# Patient Record
Sex: FEMALE | Race: WHITE | NOT HISPANIC OR LATINO | Employment: UNEMPLOYED | URBAN - NONMETROPOLITAN AREA
[De-identification: names, ages, dates, MRNs, and addresses within clinical notes are randomized per-mention and may not be internally consistent; named-entity substitution may affect disease eponyms.]

---

## 2023-08-24 PROBLEM — E10.65 TYPE 1 DIABETES MELLITUS WITH HYPERGLYCEMIA (MULTI): Status: ACTIVE | Noted: 2023-08-24

## 2023-08-24 PROBLEM — F41.9 ANXIETY: Status: ACTIVE | Noted: 2023-08-24

## 2023-08-24 PROBLEM — R59.0 ADENOPATHY, CERVICAL: Status: ACTIVE | Noted: 2023-08-24

## 2023-08-24 RX ORDER — CITALOPRAM 20 MG/1
20 TABLET, FILM COATED ORAL DAILY
COMMUNITY
Start: 2015-08-31 | End: 2023-09-11 | Stop reason: SDUPTHER

## 2023-08-24 RX ORDER — INSULIN GLARGINE 100 [IU]/ML
INJECTION, SOLUTION SUBCUTANEOUS DAILY
COMMUNITY
Start: 2022-01-03 | End: 2023-10-20 | Stop reason: WASHOUT

## 2023-08-24 RX ORDER — METFORMIN HYDROCHLORIDE 500 MG/1
2 TABLET ORAL 2 TIMES DAILY
COMMUNITY
Start: 2019-02-26 | End: 2023-09-11 | Stop reason: ALTCHOICE

## 2023-08-24 RX ORDER — SEMAGLUTIDE 1.34 MG/ML
INJECTION, SOLUTION SUBCUTANEOUS
COMMUNITY
Start: 2021-08-30 | End: 2023-09-11 | Stop reason: ALTCHOICE

## 2023-09-11 ENCOUNTER — OFFICE VISIT (OUTPATIENT)
Dept: PRIMARY CARE | Facility: CLINIC | Age: 38
End: 2023-09-11
Payer: COMMERCIAL

## 2023-09-11 VITALS
WEIGHT: 165 LBS | HEART RATE: 86 BPM | OXYGEN SATURATION: 97 % | DIASTOLIC BLOOD PRESSURE: 82 MMHG | SYSTOLIC BLOOD PRESSURE: 122 MMHG

## 2023-09-11 DIAGNOSIS — E10.65 TYPE 1 DIABETES MELLITUS WITH HYPERGLYCEMIA (MULTI): Primary | ICD-10-CM

## 2023-09-11 DIAGNOSIS — F41.9 ANXIETY: ICD-10-CM

## 2023-09-11 LAB
ALANINE AMINOTRANSFERASE (SGPT) (U/L) IN SER/PLAS: 19 U/L (ref 7–45)
ALBUMIN (G/DL) IN SER/PLAS: 4.2 G/DL (ref 3.4–5)
ALKALINE PHOSPHATASE (U/L) IN SER/PLAS: 107 U/L (ref 33–110)
ANION GAP IN SER/PLAS: 11 MMOL/L (ref 10–20)
ASPARTATE AMINOTRANSFERASE (SGOT) (U/L) IN SER/PLAS: 18 U/L (ref 9–39)
BASOPHILS (10*3/UL) IN BLOOD BY AUTOMATED COUNT: 0.02 X10E9/L (ref 0–0.1)
BASOPHILS/100 LEUKOCYTES IN BLOOD BY AUTOMATED COUNT: 0.4 % (ref 0–2)
BILIRUBIN TOTAL (MG/DL) IN SER/PLAS: 0.4 MG/DL (ref 0–1.2)
CALCIUM (MG/DL) IN SER/PLAS: 9 MG/DL (ref 8.6–10.6)
CARBON DIOXIDE, TOTAL (MMOL/L) IN SER/PLAS: 29 MMOL/L (ref 21–32)
CHLORIDE (MMOL/L) IN SER/PLAS: 103 MMOL/L (ref 98–107)
CHOLESTEROL (MG/DL) IN SER/PLAS: 165 MG/DL (ref 0–199)
CHOLESTEROL IN HDL (MG/DL) IN SER/PLAS: 53 MG/DL
CHOLESTEROL/HDL RATIO: 3.1
CREATININE (MG/DL) IN SER/PLAS: 0.5 MG/DL (ref 0.5–1.05)
EOSINOPHILS (10*3/UL) IN BLOOD BY AUTOMATED COUNT: 0.12 X10E9/L (ref 0–0.7)
EOSINOPHILS/100 LEUKOCYTES IN BLOOD BY AUTOMATED COUNT: 2.2 % (ref 0–6)
ERYTHROCYTE DISTRIBUTION WIDTH (RATIO) BY AUTOMATED COUNT: 13 % (ref 11.5–14.5)
ERYTHROCYTE MEAN CORPUSCULAR HEMOGLOBIN CONCENTRATION (G/DL) BY AUTOMATED: 31.2 G/DL (ref 32–36)
ERYTHROCYTE MEAN CORPUSCULAR VOLUME (FL) BY AUTOMATED COUNT: 89 FL (ref 80–100)
ERYTHROCYTES (10*6/UL) IN BLOOD BY AUTOMATED COUNT: 4.45 X10E12/L (ref 4–5.2)
GFR FEMALE: >90 ML/MIN/1.73M2
GLUCOSE (MG/DL) IN SER/PLAS: 222 MG/DL (ref 74–99)
HEMATOCRIT (%) IN BLOOD BY AUTOMATED COUNT: 39.7 % (ref 36–46)
HEMOGLOBIN (G/DL) IN BLOOD: 12.4 G/DL (ref 12–16)
IMMATURE GRANULOCYTES/100 LEUKOCYTES IN BLOOD BY AUTOMATED COUNT: 0.4 % (ref 0–0.9)
LDL: 98 MG/DL (ref 0–99)
LEUKOCYTES (10*3/UL) IN BLOOD BY AUTOMATED COUNT: 5.4 X10E9/L (ref 4.4–11.3)
LYMPHOCYTES (10*3/UL) IN BLOOD BY AUTOMATED COUNT: 1.6 X10E9/L (ref 1.2–4.8)
LYMPHOCYTES/100 LEUKOCYTES IN BLOOD BY AUTOMATED COUNT: 29.4 % (ref 13–44)
MONOCYTES (10*3/UL) IN BLOOD BY AUTOMATED COUNT: 0.31 X10E9/L (ref 0.1–1)
MONOCYTES/100 LEUKOCYTES IN BLOOD BY AUTOMATED COUNT: 5.7 % (ref 2–10)
NEUTROPHILS (10*3/UL) IN BLOOD BY AUTOMATED COUNT: 3.37 X10E9/L (ref 1.2–7.7)
NEUTROPHILS/100 LEUKOCYTES IN BLOOD BY AUTOMATED COUNT: 61.9 % (ref 40–80)
NRBC (PER 100 WBCS) BY AUTOMATED COUNT: 0 /100 WBC (ref 0–0)
PLATELETS (10*3/UL) IN BLOOD AUTOMATED COUNT: 295 X10E9/L (ref 150–450)
POTASSIUM (MMOL/L) IN SER/PLAS: 4.4 MMOL/L (ref 3.5–5.3)
PROTEIN TOTAL: 7 G/DL (ref 6.4–8.2)
SODIUM (MMOL/L) IN SER/PLAS: 139 MMOL/L (ref 136–145)
TRIGLYCERIDE (MG/DL) IN SER/PLAS: 70 MG/DL (ref 0–149)
UREA NITROGEN (MG/DL) IN SER/PLAS: 12 MG/DL (ref 6–23)
VLDL: 14 MG/DL (ref 0–40)

## 2023-09-11 PROCEDURE — 99213 OFFICE O/P EST LOW 20 MIN: CPT | Performed by: FAMILY MEDICINE

## 2023-09-11 PROCEDURE — 3074F SYST BP LT 130 MM HG: CPT | Performed by: FAMILY MEDICINE

## 2023-09-11 PROCEDURE — 80061 LIPID PANEL: CPT

## 2023-09-11 PROCEDURE — 82043 UR ALBUMIN QUANTITATIVE: CPT

## 2023-09-11 PROCEDURE — 3079F DIAST BP 80-89 MM HG: CPT | Performed by: FAMILY MEDICINE

## 2023-09-11 PROCEDURE — 1036F TOBACCO NON-USER: CPT | Performed by: FAMILY MEDICINE

## 2023-09-11 PROCEDURE — 82570 ASSAY OF URINE CREATININE: CPT

## 2023-09-11 PROCEDURE — 80053 COMPREHEN METABOLIC PANEL: CPT

## 2023-09-11 PROCEDURE — 85025 COMPLETE CBC W/AUTO DIFF WBC: CPT

## 2023-09-11 RX ORDER — FLASH GLUCOSE SENSOR
KIT MISCELLANEOUS
COMMUNITY
Start: 2023-07-26 | End: 2023-10-09

## 2023-09-11 RX ORDER — CITALOPRAM 20 MG/1
20 TABLET, FILM COATED ORAL DAILY
Qty: 90 TABLET | Refills: 3 | Status: SHIPPED | OUTPATIENT
Start: 2023-09-11

## 2023-09-11 RX ORDER — INSULIN LISPRO 100 [IU]/ML
INJECTION, SOLUTION INTRAVENOUS; SUBCUTANEOUS
COMMUNITY
Start: 2023-06-23 | End: 2023-10-20 | Stop reason: SDUPTHER

## 2023-09-11 NOTE — PATIENT INSTRUCTIONS
If you decide you want to get the GAP done  - let me know and I will send in order    I will have test results on your secureach card within a week  - if not - call me.          [facility] 's   (Breast Cancer and Cervical Cancer Prevention )  phone number  is [phone].     This is a program who will cover the costs of breast and pelvic exams, pap smears, and mammograms, as well as the follow up to any abnormalities found with those tests.      They  help women who are uninsured or under insured.     If you haven't yet,  please give them a call to see if you qualify for the program.    If you do,  they will have paperwork for you to fill out and send back.     If you have qualified for the program,  they will have to set up all appointments for you.     If you are Gu[place], the [facility] ( an [facility] center) sponsors periodic clinics with [facility].  You can ask the [facility] people if you could have an appointment there  OR  in the office with me.       If you are non-Guido, your visit will only be with me, here in the office.      If you already have your order for a mammogram,  be sure to contact them to set up the mammogram appointment.     Be sure to take your mammogram order with you to your appointment, along with any [facility] paperwork you may have.     If you have already had your testing, you will always hear about your results.  So if 3 weeks go by, and you have not heard anything, please let either myself or the people at [facility] know.     And,  please tell any of your friends who may not have insurance about this fantastic program!

## 2023-09-11 NOTE — PROGRESS NOTES
Subjective   Patient ID: Alessandra Medrano is a 38 y.o. female who presents for Diabetes (She sees an endocrine but the endocrine wanted her to get some other labs done.).    HPI     Diabetes type 1 -   Seeing Endocrine   NP with DR Loyda Peralta - Lantus   20 units hs                Humalog - counting carbs  and sliding scale     DTR has DMT1 too       BUD - on Citalopram 20 mg a day  - working well       WWEs - has not started yet   Gyn - sees every 2 years       Review of Systems    Objective   /82 (BP Location: Right arm, Patient Position: Sitting)   Pulse 86   Wt 74.8 kg (165 lb)   SpO2 97%     Physical Exam  Constitutional:       General: She is not in acute distress.     Appearance: Normal appearance.   HENT:      Head: Normocephalic and atraumatic.      Nose: Nose normal.      Mouth/Throat:      Pharynx: No posterior oropharyngeal erythema.   Eyes:      Extraocular Movements: Extraocular movements intact.      Conjunctiva/sclera: Conjunctivae normal.      Pupils: Pupils are equal, round, and reactive to light.   Cardiovascular:      Rate and Rhythm: Normal rate and regular rhythm.      Heart sounds: Normal heart sounds. No murmur heard.  Pulmonary:      Effort: Pulmonary effort is normal. No respiratory distress.      Breath sounds: Normal breath sounds. No wheezing.   Musculoskeletal:      Cervical back: Neck supple.   Lymphadenopathy:      Cervical: No cervical adenopathy.   Skin:     General: Skin is warm and dry.      Findings: No rash.   Neurological:      General: No focal deficit present.      Mental Status: She is alert.   Psychiatric:         Mood and Affect: Mood normal.         Thought Content: Thought content normal.         Assessment/Plan   Problem List Items Addressed This Visit          Medium    Anxiety    Relevant Medications    citalopram (CeleXA) 20 mg tablet    Type 1 diabetes mellitus with hyperglycemia (CMS/HCC) - Primary    Relevant Orders    Lipid Panel    Albumin , Urine  Random    CBC and Auto Differential    Comprehensive Metabolic Panel     38 year old -   DMT1 -   Working with endocrine -   DTR with it too     Discussed possibly  getting genetic testing     Labs today     And doing well with citalopram - refilled that     Urged WWE - gave BCCP info     We discussed at visit any disease processes that were of concern as well as the risks, benefits and instructions of any new medication provided.    See orders and discussion section for information handed to patient on their Clinical Summary.   Patient (and/or caretaker of patient if present)  stated all questions were answered, and they voiced understanding of instructions.

## 2023-09-12 LAB
ALBUMIN (MG/L) IN URINE: 75.8 MG/L
ALBUMIN/CREATININE (UG/MG) IN URINE: 66.5 UG/MG CRT (ref 0–30)
CREATININE (MG/DL) IN URINE: 114 MG/DL (ref 20–320)

## 2023-10-09 DIAGNOSIS — E10.65 TYPE 1 DIABETES MELLITUS WITH HYPERGLYCEMIA (MULTI): Primary | ICD-10-CM

## 2023-10-09 RX ORDER — FLASH GLUCOSE SENSOR
KIT MISCELLANEOUS
Qty: 6 EACH | Refills: 0 | Status: SHIPPED | OUTPATIENT
Start: 2023-10-09 | End: 2024-01-25

## 2023-10-19 NOTE — PROGRESS NOTES
Chief Complaint   Patient presents with    Diabetes     Type 1 diabetes         HPI:    Here for follow up/metabolic management 39 yo with DM Type 1 diagnosed AGE 35 initally diagnosed with Type 2 at age 24 yo. History of anxiety. Current A1C 8.3% was 8.1%. Patient testing sugars 4-6 times day with the Raquel 2/reader. Following carb controlled diet somewhat and know reasonable carb allowances. Patient is not able to afford their medications, uses  [facility] for insulins. Patient is exercising.     -CGM Raquel 2 with readerCurrently on insulin checking BS at least 4 xs a day adjustments made based on readings/frequent visits to manage.  -INSULIN Lantus 21 was 14 units nightly, decreased to 19 units today, Humalog ICR 1:5 B/L/D ISF 25>125 taking 1-2 units with am coffee.  -HTN normotensive may consider low dose ACE for renal protection + protein recent urine  -STATIN not taking LDL 98 discuss at follow up             Raquel 2 report reviewed, 90 days time in target 45% with 1% lows. Mostly going to bed in target, waking blood sugars have been closer to 130 or below, however she has had low bs between 2-4 am since increasing the basal to 21 units. Spikes greatest with dinner. Dosing with meals. Counting carbs. Will over correct with lows resulting in hyperglycemia.        Current Outpatient Medications:     citalopram (CeleXA) 20 mg tablet, Take 1 tablet (20 mg) by mouth once daily., Disp: 90 tablet, Rfl: 3    FreeStyle Raquel 2 Sensor kit, USE AS DIRECTED AND  CHANGE  OUT  EVERY  14  DAYS, Disp: 6 each, Rfl: 0    insulin glargine (Lantus) 100 unit/mL (3 mL) pen, INJECT SUBCUTANEOUSLY AS DIRECTED UP TO 30 UNITS PER DAY, Disp: 30 mL, Rfl: 2    insulin lispro (HumaLOG) 100 unit/mL injection, INJECT UNDER THE SKIN AS DIRECTED BEFORE MEALS UP TO 50 UNITS DAILY, Disp: 45 mL, Rfl: 2    /78 (BP Location: Left arm, Patient Position: Sitting)   Pulse 80   Wt 74.8 kg (165 lb)      Past Medical History:   Diagnosis  Date    Other conditions influencing health status     No significant past medical history    Personal history of other endocrine, nutritional and metabolic disease 01/24/2019    History of diabetes mellitus    Polydipsia     Polydipsia    Unspecified contact dermatitis due to plants, except food 08/11/2018    Contact dermatitis due to plant    Unspecified contact dermatitis due to plants, except food 07/13/2020    Contact dermatitis due to plant    Unspecified contact dermatitis due to plants, except food 06/14/2014    Contact dermatitis due to plant          ROS:      CARDIOLOGY:          Lightheadedness denies.  Chest pain denies.  Leg edema denies.  Palpitations denies.         RESPIRATORY:          Cough denies.  Shortness of breath denies.  Wheezing denies.         GASTROENTEROLOGY:          Abdominal pain denies.  Constipation denies.  Diarrhea denies.  Heartburn denies.         ENDOCRINOLOGY:          Cold intolerance denies.  Excessive sweating denies.  Heat intolerance denies.  Tremulousness denies.         NEUROLOGY:          Burning pain in feet denies.  Burning pain in hands denies.         PSYCHOLOGY:          Low energy denies.  Irritability denies.  Sleep disturbances denies.           Examination:     General Examination:         GENERAL APPEARANCE:  Pleasant and cooperative,No Acute Distress.          NECK: no lymphadenopathy,no thyromegaly, no dominant thyroid nodules.          HEART: no murmurs, click or rubs, regular rate and rhythm, normal S1S2.          LUNGS: clear to auscultation bilaterally, no wheezes/rhonchi/rales.          EXTREMITIES: no edema, 2+ pulses           Lab Results   Component Value Date    TSH 1.13 08/30/2021       Chemistry    Lab Results   Component Value Date/Time     09/11/2023 1152    K 4.4 09/11/2023 1152     09/11/2023 1152    CO2 29 09/11/2023 1152    BUN 12 09/11/2023 1152    CREATININE 0.50 09/11/2023 1152    Lab Results   Component Value Date/Time     CALCIUM 9.0 09/11/2023 1152    ALKPHOS 107 09/11/2023 1152    AST 18 09/11/2023 1152    ALT 19 09/11/2023 1152    BILITOT 0.4 09/11/2023 1152          Lab Results   Component Value Date    WBC 5.4 09/11/2023    HGB 12.4 09/11/2023    HCT 39.7 09/11/2023    MCV 89 09/11/2023     09/11/2023     Lab Results   Component Value Date    HGBA1C 8.3 (A) 10/20/2023       1. Poor control type I diabetes mellitus (CMS/HCC)  -self increased basal up to 21 units, having some waking lows  -having dinner time post prandial spikes, ISF 20 >120 dinner only printed for her, continue other scale breakfast and lunch  -reviewed target bs waking, post prandial and bedtime  -good rx card for sensors     - POCT glycosylated hemoglobin (Hb A1C) manually resulted    2. Type 1 diabetes mellitus with hyperglycemia (CMS/HCC)  -basal decreased   -mealtime with dinner tightened up  -discuss low dose ACE and Statin at her next visit    Follow up CNP 4 months      -previous labs and notes reviewed  -labs/tests mentioned in above note reviewed and interpreted  -reviewed and counseled on medication monitoring and side effects.

## 2023-10-20 ENCOUNTER — OFFICE VISIT (OUTPATIENT)
Dept: ENDOCRINOLOGY | Facility: CLINIC | Age: 38
End: 2023-10-20
Payer: COMMERCIAL

## 2023-10-20 VITALS — WEIGHT: 165 LBS | SYSTOLIC BLOOD PRESSURE: 109 MMHG | HEART RATE: 80 BPM | DIASTOLIC BLOOD PRESSURE: 78 MMHG

## 2023-10-20 DIAGNOSIS — Z79.4 LONG TERM (CURRENT) USE OF INSULIN (MULTI): ICD-10-CM

## 2023-10-20 DIAGNOSIS — E10.65 POOR CONTROL TYPE I DIABETES MELLITUS (MULTI): Primary | ICD-10-CM

## 2023-10-20 DIAGNOSIS — F41.9 ANXIETY: ICD-10-CM

## 2023-10-20 DIAGNOSIS — E10.65 TYPE 1 DIABETES MELLITUS WITH HYPERGLYCEMIA (MULTI): ICD-10-CM

## 2023-10-20 LAB — POC HEMOGLOBIN A1C: 8.3 % (ref 4.2–6.5)

## 2023-10-20 PROCEDURE — 3078F DIAST BP <80 MM HG: CPT | Performed by: NURSE PRACTITIONER

## 2023-10-20 PROCEDURE — 3074F SYST BP LT 130 MM HG: CPT | Performed by: NURSE PRACTITIONER

## 2023-10-20 PROCEDURE — 83036 HEMOGLOBIN GLYCOSYLATED A1C: CPT | Performed by: NURSE PRACTITIONER

## 2023-10-20 PROCEDURE — 95251 CONT GLUC MNTR ANALYSIS I&R: CPT | Performed by: NURSE PRACTITIONER

## 2023-10-20 PROCEDURE — 1036F TOBACCO NON-USER: CPT | Performed by: NURSE PRACTITIONER

## 2023-10-20 PROCEDURE — 99214 OFFICE O/P EST MOD 30 MIN: CPT | Performed by: NURSE PRACTITIONER

## 2023-10-20 ASSESSMENT — PATIENT HEALTH QUESTIONNAIRE - PHQ9
2. FEELING DOWN, DEPRESSED OR HOPELESS: NOT AT ALL
1. LITTLE INTEREST OR PLEASURE IN DOING THINGS: NOT AT ALL
SUM OF ALL RESPONSES TO PHQ9 QUESTIONS 1 & 2: 0

## 2023-10-20 ASSESSMENT — LIFESTYLE VARIABLES: HOW OFTEN DO YOU HAVE A DRINK CONTAINING ALCOHOL: NEVER

## 2023-10-20 ASSESSMENT — PAIN SCALES - GENERAL: PAINLEVEL: 0-NO PAIN

## 2023-12-29 ENCOUNTER — PHARMACY VISIT (OUTPATIENT)
Dept: PHARMACY | Facility: CLINIC | Age: 38
End: 2023-12-29
Payer: COMMERCIAL

## 2023-12-29 PROCEDURE — RXMED WILLOW AMBULATORY MEDICATION CHARGE

## 2024-01-24 DIAGNOSIS — E10.65 TYPE 1 DIABETES MELLITUS WITH HYPERGLYCEMIA (MULTI): ICD-10-CM

## 2024-01-25 RX ORDER — FLASH GLUCOSE SENSOR
KIT MISCELLANEOUS
Qty: 6 EACH | Refills: 3 | Status: SHIPPED | OUTPATIENT
Start: 2024-01-25

## 2024-02-23 ENCOUNTER — OFFICE VISIT (OUTPATIENT)
Dept: ENDOCRINOLOGY | Facility: CLINIC | Age: 39
End: 2024-02-23
Payer: COMMERCIAL

## 2024-02-23 VITALS
DIASTOLIC BLOOD PRESSURE: 68 MMHG | BODY MASS INDEX: 31.76 KG/M2 | HEART RATE: 80 BPM | WEIGHT: 172.6 LBS | SYSTOLIC BLOOD PRESSURE: 128 MMHG | HEIGHT: 62 IN

## 2024-02-23 DIAGNOSIS — E10.65: ICD-10-CM

## 2024-02-23 LAB — POC HEMOGLOBIN A1C: 7.8 % (ref 4.2–6.5)

## 2024-02-23 PROCEDURE — 99213 OFFICE O/P EST LOW 20 MIN: CPT | Performed by: NURSE PRACTITIONER

## 2024-02-23 PROCEDURE — 3078F DIAST BP <80 MM HG: CPT | Performed by: NURSE PRACTITIONER

## 2024-02-23 PROCEDURE — 3074F SYST BP LT 130 MM HG: CPT | Performed by: NURSE PRACTITIONER

## 2024-02-23 PROCEDURE — 95251 CONT GLUC MNTR ANALYSIS I&R: CPT | Performed by: NURSE PRACTITIONER

## 2024-02-23 PROCEDURE — 83036 HEMOGLOBIN GLYCOSYLATED A1C: CPT | Performed by: NURSE PRACTITIONER

## 2024-02-23 PROCEDURE — 1036F TOBACCO NON-USER: CPT | Performed by: NURSE PRACTITIONER

## 2024-02-23 ASSESSMENT — PATIENT HEALTH QUESTIONNAIRE - PHQ9
2. FEELING DOWN, DEPRESSED OR HOPELESS: NOT AT ALL
SUM OF ALL RESPONSES TO PHQ9 QUESTIONS 1 AND 2: 0
1. LITTLE INTEREST OR PLEASURE IN DOING THINGS: NOT AT ALL

## 2024-02-23 ASSESSMENT — ENCOUNTER SYMPTOMS: DEPRESSION: 0

## 2024-02-23 ASSESSMENT — PAIN SCALES - GENERAL: PAINLEVEL: 0-NO PAIN

## 2024-02-23 NOTE — PROGRESS NOTES
HPI   Presents for follow up/metabolic management 39 yo with DM Type 1 diagnosed AGE 35 initally diagnosed with Type 2 at age 24 yo. History of anxiety. Current A1C 7.8% was 8.3%. Patient testing sugars 4-6 times day with the Raquel 2/reader. Following carb controlled diet somewhat and know reasonable carb allowances. Patient is able to afford their medications. Patient is exercising.     -CGM Raquel 2 with reader.Currently on insulin checking BS at least 4 xs a day adjustments made based on readings/frequent visits to manage.  -INSULIN Lantus 19 units Humalog ICR 1:5 B/L/D ISF 25>125 taking 1-2 units with am coffee ISF 20>120 dinner only  -HTN normotensive may consider low dose ACE for renal protection + protein recent urine  -STATIN not taking LDL 98 discuss at follow up             Raquel 2 report reviewed, 90 days time in target 49% with 2% lows (compression mostly) going to bed in target, waking blood sugars have been closer to 130. Spikes greatest with dinner bu greatly improved. Dosing with meals has improved. Counting carbs.          Current Outpatient Medications:     citalopram (CeleXA) 20 mg tablet, Take 1 tablet (20 mg) by mouth once daily., Disp: 90 tablet, Rfl: 3    FreeStyle Raquel sensor system (FreeStyle Raquel 2 Sensor) kit, USE AS DIRECTED AND CHANGE OUT EVERY 14 DAYS, Disp: 6 each, Rfl: 3    insulin glargine (Lantus) 100 unit/mL (3 mL) pen, INJECT SUBCUTANEOUSLY AS DIRECTED UP TO 30 UNITS PER DAY, Disp: 30 mL, Rfl: 2    insulin lispro (HumaLOG) 100 unit/mL injection, INJECT UNDER THE SKIN AS DIRECTED BEFORE MEALS UP TO 50 UNITS DAILY, Disp: 45 mL, Rfl: 2      Allergies as of 02/23/2024 - Reviewed 02/23/2024   Allergen Reaction Noted    Sulfa (sulfonamide antibiotics) Unknown 08/24/2023         Review of Systems   Cardiology: Lightheadedness-denies.  Chest pain-denies.  Leg edema-denies.  Palpitations-denies.  Respiratory: Cough-denies. Shortness of breath-denies.  Wheezing-denies.  Gastroenterology:  "Constipation-denies.  Diarrhea-denies.  Heartburn-denies.  Endocrinology: Cold intolerance-denies.  Heat intolerance-denies.  Sweats-denies.  Neurology: Headache-denies.  Tremor-denies.  Neuropathy in extremities-denies.  Psychology: Low energy-denies.  Irritability-denies.  Sleep disturbances-denies.      /68 (BP Location: Left arm, Patient Position: Sitting)   Pulse 80   Ht 1.562 m (5' 1.5\")   Wt 78.3 kg (172 lb 9.6 oz)   BMI 32.08 kg/m²       Labs:  Lab Results   Component Value Date    WBC 5.4 09/11/2023    NRBC 0.0 09/11/2023    RBC 4.45 09/11/2023    HGB 12.4 09/11/2023    HCT 39.7 09/11/2023     09/11/2023     Lab Results   Component Value Date    CALCIUM 9.0 09/11/2023    AST 18 09/11/2023    ALKPHOS 107 09/11/2023    BILITOT 0.4 09/11/2023    PROT 7.0 09/11/2023    ALBUMIN 4.2 09/11/2023     09/11/2023    K 4.4 09/11/2023     09/11/2023    CO2 29 09/11/2023    ANIONGAP 11 09/11/2023    BUN 12 09/11/2023    CREATININE 0.50 09/11/2023    GLUCOSE 222 (H) 09/11/2023    ALT 19 09/11/2023     Lab Results   Component Value Date    CHOL 165 09/11/2023    TRIG 70 09/11/2023    HDL 53.0 09/11/2023     Lab Results   Component Value Date    MICROALBCREA 66.5 (H) 09/11/2023     Lab Results   Component Value Date    TSH 1.13 08/30/2021       Lab Results   Component Value Date    HGBA1C 7.8 (A) 02/23/2024         Physical Exam   General Appearance: pleasant, cooperative, no acute distress  HEENT: no chemosis, no proptosis, no lid lag, no lid retraction  Neck: no lymphadenopathy, no thyromegaly, no dominant thyroid nodules  Heart: no murmurs, regular rate and rhythm, S1 and S2  Lungs: no wheezes, no rhonci, no rales  Extremities: no lower extremity swelling      Assessment/Plan   1. Controlled type 1 diabetes mellitus with hyperglycemia, with long-term current use of insulin (CMS/[facility])  -excellent reduction in A1c  -will continue to work on pre-bolusing  -no changes in dosing  -reviewed how to " adjust basal insulin based on waking BS  -declines ACE/ARB and statin for prevention at this time    - POCT glycosylated hemoglobin (Hb A1C) manually resulted       Follow Up: 4 months    -labs/tests/notes reviewed  -reviewed and counseled patient on medication monitoring and side effects

## 2024-03-13 DIAGNOSIS — E10.65: Primary | ICD-10-CM

## 2024-03-13 DIAGNOSIS — E10.65 POOR CONTROL TYPE I DIABETES MELLITUS (MULTI): ICD-10-CM

## 2024-03-13 PROCEDURE — RXMED WILLOW AMBULATORY MEDICATION CHARGE

## 2024-03-13 RX ORDER — INSULIN LISPRO 100 [IU]/ML
INJECTION, SOLUTION INTRAVENOUS; SUBCUTANEOUS
Qty: 45 ML | Refills: 3 | Status: SHIPPED | OUTPATIENT
Start: 2024-03-13

## 2024-03-14 ENCOUNTER — PHARMACY VISIT (OUTPATIENT)
Dept: PHARMACY | Facility: CLINIC | Age: 39
End: 2024-03-14
Payer: COMMERCIAL

## 2024-03-14 PROCEDURE — RXMED WILLOW AMBULATORY MEDICATION CHARGE

## 2024-06-13 PROCEDURE — RXMED WILLOW AMBULATORY MEDICATION CHARGE

## 2024-06-17 ENCOUNTER — PHARMACY VISIT (OUTPATIENT)
Dept: PHARMACY | Facility: CLINIC | Age: 39
End: 2024-06-17
Payer: COMMERCIAL

## 2024-06-28 ENCOUNTER — APPOINTMENT (OUTPATIENT)
Dept: ENDOCRINOLOGY | Facility: CLINIC | Age: 39
End: 2024-06-28
Payer: COMMERCIAL

## 2024-06-28 VITALS
SYSTOLIC BLOOD PRESSURE: 126 MMHG | HEART RATE: 90 BPM | DIASTOLIC BLOOD PRESSURE: 78 MMHG | WEIGHT: 170 LBS | BODY MASS INDEX: 31.6 KG/M2

## 2024-06-28 DIAGNOSIS — F41.9 ANXIETY: ICD-10-CM

## 2024-06-28 DIAGNOSIS — Z79.4 LONG TERM (CURRENT) USE OF INSULIN (MULTI): ICD-10-CM

## 2024-06-28 DIAGNOSIS — E10.65 TYPE 1 DIABETES MELLITUS WITH HYPERGLYCEMIA (MULTI): Primary | ICD-10-CM

## 2024-06-28 LAB — POC HEMOGLOBIN A1C: 8.2 % (ref 4.2–6.5)

## 2024-06-28 PROCEDURE — 99214 OFFICE O/P EST MOD 30 MIN: CPT | Performed by: NURSE PRACTITIONER

## 2024-06-28 PROCEDURE — 83036 HEMOGLOBIN GLYCOSYLATED A1C: CPT | Performed by: NURSE PRACTITIONER

## 2024-06-28 PROCEDURE — 1036F TOBACCO NON-USER: CPT | Performed by: NURSE PRACTITIONER

## 2024-06-28 PROCEDURE — 3074F SYST BP LT 130 MM HG: CPT | Performed by: NURSE PRACTITIONER

## 2024-06-28 PROCEDURE — 3078F DIAST BP <80 MM HG: CPT | Performed by: NURSE PRACTITIONER

## 2024-06-28 ASSESSMENT — PAIN SCALES - GENERAL: PAINLEVEL: 0-NO PAIN

## 2024-06-28 NOTE — PROGRESS NOTES
HPI   Presents for follow up/metabolic management 38 yo with DM Type 1 diagnosed AGE 35 initally diagnosed with Type 2 at age 24 yo. History of anxiety. Current A1C 8.2% was 7.8%. Patient testing sugars 4-6 times day with the Raquel 2/reader. Following carb controlled diet somewhat and know reasonable carb allowances. Patient is able to afford their medications. Patient is exercising. Several celebrations lately, eating more sweets/carbs.      -CGM Raquel 2 with reader.Currently on insulin checking BS at least 4 xs a day adjustments made based on readings/frequent visits to manage.  -INSULIN Lantus 19 units Humalog ICR 1:5 B/L/D ISF 25>125 taking 1-2 units with am coffee ISF 20>120 dinner only she forgot to try this will simplify this visit  -HTN normotensive may consider low dose ACE for renal protection + protein recent urine  -STATIN not taking LDL 98 declines taking as a preventative             Raquel 2 report reviewed, 90 days time in target 43% with 1% lows going to bed above target, waking blood sugars have been closer to 130 and spike quickly upon getting out of bed, greatest spike with dinner. Dosing with meals is a challenge. Counting carbs, uses calculator.       Current Outpatient Medications:     citalopram (CeleXA) 20 mg tablet, Take 1 tablet (20 mg) by mouth once daily., Disp: 90 tablet, Rfl: 3    FreeStyle Raquel sensor system (FreeStyle Raquel 2 Sensor) kit, USE AS DIRECTED AND CHANGE OUT EVERY 14 DAYS, Disp: 6 each, Rfl: 3    insulin lispro (HumaLOG KwikPen Insulin) 100 unit/mL injection, INJECT UNDER THE SKIN AS DIRECTED BEFORE MEALS UP TO 50 UNITS DAILY, Disp: 45 mL, Rfl: 3    insulin glargine (Lantus) 100 unit/mL (3 mL) pen, INJECT SUBCUTANEOUSLY AS DIRECTED UP TO 30 UNITS PER DAY, Disp: 30 mL, Rfl: 2      Allergies as of 06/28/2024 - Reviewed 06/28/2024   Allergen Reaction Noted    Sulfa (sulfonamide antibiotics) Unknown 08/24/2023         Review of Systems   Cardiology: Lightheadedness-denies.   Chest pain-denies.  Leg edema-denies.  Palpitations-denies.  Respiratory: Cough-denies. Shortness of breath-denies.  Wheezing-denies.  Gastroenterology: Constipation-denies.  Diarrhea-denies.  Heartburn-denies.  Endocrinology: Cold intolerance-denies.  Heat intolerance-denies.  Sweats-denies.  Neurology: Headache-denies.  Tremor-denies.  Neuropathy in extremities-denies.  Psychology: Low energy-denies.  Irritability-denies.  Sleep disturbances-denies.      /78 (BP Location: Right arm, Patient Position: Sitting)   Pulse 90   Wt 77.1 kg (170 lb)   BMI 31.60 kg/m²       Labs:  Lab Results   Component Value Date    WBC 5.4 09/11/2023    NRBC 0.0 09/11/2023    RBC 4.45 09/11/2023    HGB 12.4 09/11/2023    HCT 39.7 09/11/2023     09/11/2023     Lab Results   Component Value Date    CALCIUM 9.0 09/11/2023    AST 18 09/11/2023    ALKPHOS 107 09/11/2023    BILITOT 0.4 09/11/2023    PROT 7.0 09/11/2023    ALBUMIN 4.2 09/11/2023     09/11/2023    K 4.4 09/11/2023     09/11/2023    CO2 29 09/11/2023    ANIONGAP 11 09/11/2023    BUN 12 09/11/2023    CREATININE 0.50 09/11/2023    GLUCOSE 222 (H) 09/11/2023    ALT 19 09/11/2023     Lab Results   Component Value Date    CHOL 165 09/11/2023    TRIG 70 09/11/2023    HDL 53.0 09/11/2023     Lab Results   Component Value Date    MICROALBCREA 66.5 (H) 09/11/2023     Lab Results   Component Value Date    TSH 1.13 08/30/2021       Lab Results   Component Value Date    HGBA1C 8.2 (A) 06/28/2024         Physical Exam   General Appearance: pleasant, cooperative, no acute distress  HEENT: no chemosis, no proptosis, no lid lag, no lid retraction  Neck: no lymphadenopathy, no thyromegaly, no dominant thyroid nodules  Heart: no murmurs, regular rate and rhythm, S1 and S2  Lungs: no wheezes, no rhonci, no rales  Extremities: no lower extremity swelling      Assessment/Plan   1. Controlled type 1 diabetes mellitus with hyperglycemia, with long-term current use of  insulin (CMS/[facility])  -bump in the A1c  -recommended she add 2 units to her scale for dinner time only with further instruction  -recommended limiting carbs to 45 grams or less per meal  -reviewed target BS  -will continue to work on pre-bolusing  -plan to order labs at [facility] if not done by PCP  -meet with Diabetic Educator next visit    -declines ACE/ARB and statin for prevention at this time    - POCT glycosylated hemoglobin (Hb A1C) manually resulted    2. Anxiety  -stable    3. Long term (current) use of insulin (Multi)    Follow Up: 4 months [place]    -labs/tests/notes reviewed  -reviewed and counseled patient on medication monitoring and side effects

## 2024-06-28 NOTE — PATIENT INSTRUCTIONS
IF YOUR BLOOD SUGAR IS NOT UNDER 180-200 2 HOURS AFTER YOU EAT THAT MEANS THE MEALTIME DOSE OF INSULIN WAS NOT ENOUGH FOR THAT MEAL  TRY ADDING 2 UNITS FOR DINNER TIME ONLY WITH YOUR SCALE YOU HAVE  IN 1 WEEK IF THE BLOOD SUGAR IS NOT IMPROVING ADD 1 MORE UNIT AND SO ON

## 2024-07-12 ENCOUNTER — PHARMACY VISIT (OUTPATIENT)
Dept: PHARMACY | Facility: CLINIC | Age: 39
End: 2024-07-12
Payer: COMMERCIAL

## 2024-07-12 PROCEDURE — RXMED WILLOW AMBULATORY MEDICATION CHARGE

## 2024-08-19 PROCEDURE — RXMED WILLOW AMBULATORY MEDICATION CHARGE

## 2024-08-20 ENCOUNTER — PHARMACY VISIT (OUTPATIENT)
Dept: PHARMACY | Facility: CLINIC | Age: 39
End: 2024-08-20
Payer: COMMERCIAL

## 2024-09-20 DIAGNOSIS — E10.65 TYPE 1 DIABETES MELLITUS WITH HYPERGLYCEMIA (MULTI): Primary | ICD-10-CM

## 2024-09-20 PROCEDURE — RXMED WILLOW AMBULATORY MEDICATION CHARGE

## 2024-09-20 RX ORDER — INSULIN GLARGINE 100 [IU]/ML
INJECTION, SOLUTION SUBCUTANEOUS
Qty: 30 ML | Refills: 2 | Status: SHIPPED | OUTPATIENT
Start: 2024-09-20 | End: 2025-09-20

## 2024-09-23 ENCOUNTER — PHARMACY VISIT (OUTPATIENT)
Dept: PHARMACY | Facility: CLINIC | Age: 39
End: 2024-09-23
Payer: COMMERCIAL

## 2024-10-25 PROCEDURE — RXMED WILLOW AMBULATORY MEDICATION CHARGE

## 2024-10-29 ENCOUNTER — PHARMACY VISIT (OUTPATIENT)
Dept: PHARMACY | Facility: CLINIC | Age: 39
End: 2024-10-29
Payer: COMMERCIAL

## 2024-10-30 ENCOUNTER — APPOINTMENT (OUTPATIENT)
Dept: ENDOCRINOLOGY | Facility: CLINIC | Age: 39
End: 2024-10-30
Payer: COMMERCIAL

## 2024-10-30 VITALS
WEIGHT: 172 LBS | DIASTOLIC BLOOD PRESSURE: 73 MMHG | SYSTOLIC BLOOD PRESSURE: 113 MMHG | BODY MASS INDEX: 31.97 KG/M2 | HEART RATE: 71 BPM

## 2024-10-30 DIAGNOSIS — E10.65 TYPE 1 DIABETES MELLITUS WITH HYPERGLYCEMIA (MULTI): ICD-10-CM

## 2024-10-30 LAB — POC HEMOGLOBIN A1C: 8.4 % (ref 4.2–6.5)

## 2024-10-30 PROCEDURE — 83036 HEMOGLOBIN GLYCOSYLATED A1C: CPT | Performed by: INTERNAL MEDICINE

## 2024-10-30 PROCEDURE — 99214 OFFICE O/P EST MOD 30 MIN: CPT | Performed by: INTERNAL MEDICINE

## 2024-10-30 ASSESSMENT — PAIN SCALES - GENERAL: PAINLEVEL_OUTOF10: 0-NO PAIN

## 2024-11-27 PROCEDURE — RXMED WILLOW AMBULATORY MEDICATION CHARGE

## 2024-12-02 ENCOUNTER — PHARMACY VISIT (OUTPATIENT)
Dept: PHARMACY | Facility: CLINIC | Age: 39
End: 2024-12-02
Payer: COMMERCIAL

## 2024-12-05 DIAGNOSIS — E10.65 TYPE 1 DIABETES MELLITUS WITH HYPERGLYCEMIA (MULTI): ICD-10-CM

## 2024-12-05 RX ORDER — FLASH GLUCOSE SENSOR
KIT MISCELLANEOUS
Qty: 6 EACH | Refills: 3 | Status: SHIPPED | OUTPATIENT
Start: 2024-12-05

## 2024-12-27 ENCOUNTER — PHARMACY VISIT (OUTPATIENT)
Dept: PHARMACY | Facility: CLINIC | Age: 39
End: 2024-12-27
Payer: COMMERCIAL

## 2024-12-27 ENCOUNTER — APPOINTMENT (OUTPATIENT)
Dept: PRIMARY CARE | Facility: CLINIC | Age: 39
End: 2024-12-27
Payer: COMMERCIAL

## 2024-12-27 VITALS
HEART RATE: 88 BPM | WEIGHT: 172 LBS | DIASTOLIC BLOOD PRESSURE: 82 MMHG | BODY MASS INDEX: 31.97 KG/M2 | SYSTOLIC BLOOD PRESSURE: 117 MMHG | OXYGEN SATURATION: 95 %

## 2024-12-27 DIAGNOSIS — F41.9 ANXIETY: ICD-10-CM

## 2024-12-27 DIAGNOSIS — E10.65 TYPE 1 DIABETES MELLITUS WITH HYPERGLYCEMIA (MULTI): ICD-10-CM

## 2024-12-27 LAB
ALBUMIN SERPL BCP-MCNC: 3.9 G/DL (ref 3.4–5)
ALP SERPL-CCNC: 113 U/L (ref 33–110)
ALT SERPL W P-5'-P-CCNC: 24 U/L (ref 7–45)
ANION GAP SERPL CALC-SCNC: 13 MMOL/L (ref 10–20)
AST SERPL W P-5'-P-CCNC: 16 U/L (ref 9–39)
BASOPHILS # BLD AUTO: 0.03 X10*3/UL (ref 0–0.1)
BASOPHILS NFR BLD AUTO: 0.5 %
BILIRUB SERPL-MCNC: 0.5 MG/DL (ref 0–1.2)
BUN SERPL-MCNC: 17 MG/DL (ref 6–23)
CALCIUM SERPL-MCNC: 8.5 MG/DL (ref 8.6–10.3)
CHLORIDE SERPL-SCNC: 97 MMOL/L (ref 98–107)
CHOLEST SERPL-MCNC: 170 MG/DL (ref 0–199)
CHOLESTEROL/HDL RATIO: 3
CO2 SERPL-SCNC: 29 MMOL/L (ref 21–32)
CREAT SERPL-MCNC: 0.59 MG/DL (ref 0.5–1.05)
EGFRCR SERPLBLD CKD-EPI 2021: >90 ML/MIN/1.73M*2
EOSINOPHIL # BLD AUTO: 0.14 X10*3/UL (ref 0–0.7)
EOSINOPHIL NFR BLD AUTO: 2.5 %
ERYTHROCYTE [DISTWIDTH] IN BLOOD BY AUTOMATED COUNT: 13.6 % (ref 11.5–14.5)
GLUCOSE SERPL-MCNC: 232 MG/DL (ref 74–99)
HCT VFR BLD AUTO: 38.7 % (ref 36–46)
HDLC SERPL-MCNC: 56.7 MG/DL
HGB BLD-MCNC: 12.4 G/DL (ref 12–16)
IMM GRANULOCYTES # BLD AUTO: 0.01 X10*3/UL (ref 0–0.7)
IMM GRANULOCYTES NFR BLD AUTO: 0.2 % (ref 0–0.9)
LDLC SERPL CALC-MCNC: 90 MG/DL
LYMPHOCYTES # BLD AUTO: 1.56 X10*3/UL (ref 1.2–4.8)
LYMPHOCYTES NFR BLD AUTO: 28.1 %
MCH RBC QN AUTO: 27.2 PG (ref 26–34)
MCHC RBC AUTO-ENTMCNC: 32 G/DL (ref 32–36)
MCV RBC AUTO: 85 FL (ref 80–100)
MONOCYTES # BLD AUTO: 0.25 X10*3/UL (ref 0.1–1)
MONOCYTES NFR BLD AUTO: 4.5 %
NEUTROPHILS # BLD AUTO: 3.56 X10*3/UL (ref 1.2–7.7)
NEUTROPHILS NFR BLD AUTO: 64.2 %
NON HDL CHOLESTEROL: 113 MG/DL (ref 0–149)
NRBC BLD-RTO: 0 /100 WBCS (ref 0–0)
PLATELET # BLD AUTO: 261 X10*3/UL (ref 150–450)
POTASSIUM SERPL-SCNC: 4.3 MMOL/L (ref 3.5–5.3)
PROT SERPL-MCNC: 6.6 G/DL (ref 6.4–8.2)
RBC # BLD AUTO: 4.56 X10*6/UL (ref 4–5.2)
SODIUM SERPL-SCNC: 135 MMOL/L (ref 136–145)
TRIGL SERPL-MCNC: 118 MG/DL (ref 0–149)
TSH SERPL-ACNC: 0.99 MIU/L (ref 0.44–3.98)
VLDL: 24 MG/DL (ref 0–40)
WBC # BLD AUTO: 5.6 X10*3/UL (ref 4.4–11.3)

## 2024-12-27 PROCEDURE — 80053 COMPREHEN METABOLIC PANEL: CPT

## 2024-12-27 PROCEDURE — 99213 OFFICE O/P EST LOW 20 MIN: CPT | Performed by: FAMILY MEDICINE

## 2024-12-27 PROCEDURE — 85025 COMPLETE CBC W/AUTO DIFF WBC: CPT

## 2024-12-27 PROCEDURE — 3079F DIAST BP 80-89 MM HG: CPT | Performed by: FAMILY MEDICINE

## 2024-12-27 PROCEDURE — RXMED WILLOW AMBULATORY MEDICATION CHARGE

## 2024-12-27 PROCEDURE — 1036F TOBACCO NON-USER: CPT | Performed by: FAMILY MEDICINE

## 2024-12-27 PROCEDURE — 80061 LIPID PANEL: CPT

## 2024-12-27 PROCEDURE — 84443 ASSAY THYROID STIM HORMONE: CPT

## 2024-12-27 PROCEDURE — 3074F SYST BP LT 130 MM HG: CPT | Performed by: FAMILY MEDICINE

## 2024-12-27 PROCEDURE — 82043 UR ALBUMIN QUANTITATIVE: CPT

## 2024-12-27 PROCEDURE — 82570 ASSAY OF URINE CREATININE: CPT

## 2024-12-27 RX ORDER — CITALOPRAM 20 MG/1
20 TABLET, FILM COATED ORAL DAILY
Qty: 90 TABLET | Refills: 3 | Status: SHIPPED | OUTPATIENT
Start: 2024-12-27

## 2024-12-27 NOTE — PATIENT INSTRUCTIONS
If you ever decide to cut back on the Citalopram -     Take 1/2 only for a few weeks to see how you do    If you ever decide you want to get the [facility] GAP   - genetic [facility] panel -   To test for genetic diseases that run in the [facility] - let me know     [facility] 's   (Breast Cancer and Cervical Cancer Prevention )  phone number  is [phone].     This is a program who will cover the costs of breast and pelvic exams, pap smears, and mammograms, as well as the follow up to any abnormalities found with those tests.      They  help women who are uninsured or under insured.     If you haven't yet,  please give them a call to see if you qualify for the program.    If you do,  they will have paperwork for you to fill out and send back.     If you have qualified for the program,  they will have to set up all appointments for you.     If you are [place], the [facility] ( an [facility]) sponsors periodic clinics with [facility].  You can ask the [facility] people if you could have an appointment there  OR  in the office with me.       If you are non-Guido, your visit will only be with me, here in the office.      If you already have your order for a mammogram,  be sure to contact them to set up the mammogram appointment.     Be sure to take your mammogram order with you to your appointment, along with any [facility] paperwork you may have.     If you have already had your testing, you will always hear about your results.  So if 3 weeks go by, and you have not heard anything, please let either myself or the people at [facility] know.     And,  please tell any of your friends who may not have insurance about this fantastic program!

## 2024-12-27 NOTE — ASSESSMENT & PLAN NOTE
Orders:    TSH with reflex to Free T4 if abnormal    CBC and Auto Differential    Albumin-Creatinine Ratio, Urine Random    Comprehensive Metabolic Panel    Lipid Panel    Labs for endocrine today

## 2024-12-27 NOTE — PROGRESS NOTES
Subjective   Patient ID: Alessandra Medrano is a 39 y.o. female who presents for follow up     HPI     LOV a year ago     Updates and Concerns:    No concerns    Needs to get labs for endocrine today and needs a refill of her Citalopram         Chronic issues reviewed today:     BUD - on Citalopram 20 mg a day  - working well   No side effects  Takes it in the PM  - sleeps well   Mood under good control  Would like to continue it       Diabetes type 1 -   Seeing Endocrine   NP with DR Scales   Meds - Lantus   19   units hs                Humalog - counting carbs  and sliding scale     DTR has DMT1 too         WWEs -   Mamm - has not started yet      No BRCA in family   Gyn - sees every 2 years     CRC - no colon cancer in the family     Vaccines  -   Does not get flu shots   Did get childhood immunizations       Review of Systems    Objective   /82 (BP Location: Left arm, Patient Position: Sitting, BP Cuff Size: Large adult)   Pulse 88   Wt 78 kg (172 lb)   SpO2 95%   BMI 31.97 kg/m²     Physical Exam  Constitutional:       General: She is not in acute distress.     Appearance: Normal appearance.   HENT:      Head: Normocephalic and atraumatic.      Nose: Nose normal.      Mouth/Throat:      Pharynx: No posterior oropharyngeal erythema.   Eyes:      Extraocular Movements: Extraocular movements intact.      Conjunctiva/sclera: Conjunctivae normal.      Pupils: Pupils are equal, round, and reactive to light.   Cardiovascular:      Rate and Rhythm: Normal rate and regular rhythm.      Heart sounds: Normal heart sounds. No murmur heard.  Pulmonary:      Effort: Pulmonary effort is normal. No respiratory distress.      Breath sounds: Normal breath sounds. No wheezing.   Musculoskeletal:      Cervical back: Neck supple.   Lymphadenopathy:      Cervical: No cervical adenopathy.   Skin:     General: Skin is warm and dry.      Findings: No rash.   Neurological:      General: No focal deficit present.      Mental  Status: She is alert.   Psychiatric:         Mood and Affect: Mood normal.         Thought Content: Thought content normal.         Assessment & Plan  Anxiety    Orders:    citalopram (CeleXA) 20 mg tablet; Take 1 tablet (20 mg) by mouth once daily.    Stable - cont same med   Type 1 diabetes mellitus with hyperglycemia (Multi)    Orders:    TSH with reflex to Free T4 if abnormal    CBC and Auto Differential    Albumin-Creatinine Ratio, Urine Random    Comprehensive Metabolic Panel    Lipid Panel    Labs for endocrine today        39 year old -   DMT1 -   Working with endocrine -   DTR with it too     Discussed possibly  getting genetic testing again     Labs today for endocrine     And doing well with citalopram - refilled that     Urged WWE - gave BCCP info again     We discussed at visit any disease processes that were of concern as well as the risks, benefits and instructions of any new medication provided.    See orders and discussion section for information handed to patient on their Clinical Summary.   Patient (and/or caretaker of patient if present)  stated all questions were answered, and they voiced understanding of instructions.

## 2024-12-27 NOTE — ASSESSMENT & PLAN NOTE
Orders:    citalopram (CeleXA) 20 mg tablet; Take 1 tablet (20 mg) by mouth once daily.    Stable - cont same med

## 2024-12-28 LAB
CREAT UR-MCNC: 166.7 MG/DL (ref 20–320)
MICROALBUMIN UR-MCNC: 308.2 MG/L
MICROALBUMIN/CREAT UR: 184.9 UG/MG CREAT

## 2025-01-30 PROCEDURE — RXMED WILLOW AMBULATORY MEDICATION CHARGE

## 2025-01-31 ENCOUNTER — PHARMACY VISIT (OUTPATIENT)
Dept: PHARMACY | Facility: CLINIC | Age: 40
End: 2025-01-31
Payer: COMMERCIAL

## 2025-02-25 PROCEDURE — RXMED WILLOW AMBULATORY MEDICATION CHARGE

## 2025-02-27 ENCOUNTER — PHARMACY VISIT (OUTPATIENT)
Dept: PHARMACY | Facility: CLINIC | Age: 40
End: 2025-02-27
Payer: COMMERCIAL

## 2025-03-26 DIAGNOSIS — E10.65 POOR CONTROL TYPE I DIABETES MELLITUS: ICD-10-CM

## 2025-03-26 PROCEDURE — RXMED WILLOW AMBULATORY MEDICATION CHARGE

## 2025-03-26 RX ORDER — INSULIN LISPRO 100 [IU]/ML
INJECTION, SOLUTION INTRAVENOUS; SUBCUTANEOUS
Qty: 45 ML | Refills: 3 | Status: SHIPPED | OUTPATIENT
Start: 2025-03-26

## 2025-03-29 ENCOUNTER — PHARMACY VISIT (OUTPATIENT)
Dept: PHARMACY | Facility: CLINIC | Age: 40
End: 2025-03-29
Payer: COMMERCIAL

## 2025-04-16 ENCOUNTER — APPOINTMENT (OUTPATIENT)
Facility: CLINIC | Age: 40
End: 2025-04-16
Payer: COMMERCIAL

## 2025-04-26 PROCEDURE — RXMED WILLOW AMBULATORY MEDICATION CHARGE

## 2025-04-28 ENCOUNTER — PHARMACY VISIT (OUTPATIENT)
Dept: PHARMACY | Facility: CLINIC | Age: 40
End: 2025-04-28
Payer: COMMERCIAL

## 2025-04-29 NOTE — PROGRESS NOTES
"HPI    41 yo with DM Type 1 diagnosed age 35, initally diagnosed with Type 2 at age 22 yo. History of anxiety. Current A1c 8.4% was 8.5%.     Patient testing sugars 4-6 times day with the Raquel 2/reader. Following carb controlled diet somewhat and know reasonable carb allowances. Patient is able to afford their medications.   Patient is active.     -taking lantus 20units, humalog ICR 1:4.5 B/L/D  -ISF 25>125, ISF 20>120 dinner only, ISF 25>125 at bedtime     -not taking statin, declines taking for primary prevention           30 days raquel 2 data: 35% target range 1% lows, pattern: low 100's overnight and waking, low 200's after breakfasts, upper 100's lunch, after dinner mid 200's until bedtime.  -correction at bedtime causing lows overnight        Current Medications[1]      Allergies as of 04/30/2025 - Reviewed 04/30/2025   Allergen Reaction Noted    Sulfa (sulfonamide antibiotics) Unknown 08/24/2023         Review of Systems   Cardiology: Lightheadedness-denies.  Chest pain-denies.  Leg edema-denies.  Palpitations-denies.  Respiratory: Cough-denies. Shortness of breath-denies.  Wheezing-denies.  Gastroenterology: Constipation-denies.  Diarrhea-denies.  Heartburn-denies.  Endocrinology: Cold intolerance-denies.  Heat intolerance-denies.  Sweats-denies.  Neurology: Headache-denies.  Tremor-denies.  Neuropathy in extremities-denies.  Psychology: Low energy-denies.  Irritability-denies.  Sleep disturbances-denies.      /84   Pulse 78   Ht 1.6 m (5' 3\")   Wt 78.5 kg (173 lb)   BMI 30.65 kg/m²       Labs:  Lab Results   Component Value Date    WBC 5.6 12/27/2024    NRBC 0.0 12/27/2024    RBC 4.56 12/27/2024    HGB 12.4 12/27/2024    HCT 38.7 12/27/2024     12/27/2024     Lab Results   Component Value Date    CALCIUM 8.5 (L) 12/27/2024    AST 16 12/27/2024    ALKPHOS 113 (H) 12/27/2024    BILITOT 0.5 12/27/2024    PROT 6.6 12/27/2024    ALBUMIN 3.9 12/27/2024     (L) 12/27/2024    K 4.3 " "12/27/2024    CL 97 (L) 12/27/2024    CO2 29 12/27/2024    ANIONGAP 13 12/27/2024    BUN 17 12/27/2024    CREATININE 0.59 12/27/2024    GLUCOSE 232 (H) 12/27/2024    ALT 24 12/27/2024    EGFR >90 12/27/2024     Lab Results   Component Value Date    CHOL 170 12/27/2024    TRIG 118 12/27/2024    HDL 56.7 12/27/2024    LDLCALC 90 12/27/2024     Lab Results   Component Value Date    MICROALBCREA 184.9 (H) 12/27/2024     Lab Results   Component Value Date    TSH 0.99 12/27/2024     No results found for: \"[id number]\"  Lab Results   Component Value Date    HGBA1C 8.5 (A) 04/30/2025         Physical Exam   General Appearance: pleasant, cooperative, no acute distress  HEENT: no chemosis, no proptosis, no lid lag, no lid retraction  Neck: no lymphadenopathy, no thyromegaly, no dominant thyroid nodules  Heart: no murmurs, regular rate and rhythm, S1 and S2  Lungs: no wheezes, no rhonci, no rales  Extremities: no lower extremity swelling      Assessment/Plan   1. Type 1 diabetes mellitus with hyperglycemia (Multi) (Primary)  -A1c ordered and reviewed  -glycemic log (dayanna data reviewed)  -labs reviewed    -call about medtronic pump/aid system-there is a considerable cost savings for [facility] pt's with this brand  -work on dosing 10-20gram carb allowance with am coffee  -change I:C from 1:4.5 to 1:4 at all meals  -reviewed glycemic targets again    -please have dilated eye exam      Follow Up:  BEAN Conn 6 months, sooner if starting pump    Medical Decision Making  Complexity of problem: Chronic illness of diabetes mellitus uncontrolled, progressing  Data analyzed and reviewed: Reviewed prior notes, blood glucose data, labs including HgbA1c, lipids, serum chemistries.  Ordered tests.   Risk of complications and morbidities: Is definite because of use of insulin and risk of hypoglycemia.  Prescription medications reviewed and modifications made.  Compliance assessed.  Addressed social determinants of health including food " insecurity.              [1]   Current Outpatient Medications:     citalopram (CeleXA) 20 mg tablet, Take 1 tablet (20 mg) by mouth once daily., Disp: 90 tablet, Rfl: 3    flash glucose sensor kit (FreeStyle Raquel 2 Sensor) kit, Use as instructed change every 14 days, Disp: 6 each, Rfl: 3    insulin glargine (Lantus Solostar U-100 Insulin) 100 unit/mL (3 mL) pen, INJECT SUBCUTANEOUSLY AS DIRECTED UP TO 30 UNITS PER DAY, Disp: 30 mL, Rfl: 2    insulin lispro (HumaLOG KwikPen Insulin) 100 unit/mL pen, INJECT UNDER THE SKIN AS DIRECTED BEFORE MEALS UP TO 50 UNITS DAILY, Disp: 45 mL, Rfl: 3

## 2025-04-30 ENCOUNTER — APPOINTMENT (OUTPATIENT)
Dept: ENDOCRINOLOGY | Facility: CLINIC | Age: 40
End: 2025-04-30
Payer: COMMERCIAL

## 2025-04-30 VITALS
DIASTOLIC BLOOD PRESSURE: 84 MMHG | BODY MASS INDEX: 30.65 KG/M2 | WEIGHT: 173 LBS | HEIGHT: 63 IN | HEART RATE: 78 BPM | SYSTOLIC BLOOD PRESSURE: 118 MMHG

## 2025-04-30 DIAGNOSIS — E10.65 TYPE 1 DIABETES MELLITUS WITH HYPERGLYCEMIA (MULTI): Primary | ICD-10-CM

## 2025-04-30 DIAGNOSIS — F41.9 ANXIETY: ICD-10-CM

## 2025-04-30 LAB — POC HEMOGLOBIN A1C: 8.5 % (ref 4.2–6.5)

## 2025-04-30 PROCEDURE — 3052F HG A1C>EQUAL 8.0%<EQUAL 9.0%: CPT | Performed by: INTERNAL MEDICINE

## 2025-04-30 PROCEDURE — 3079F DIAST BP 80-89 MM HG: CPT | Performed by: INTERNAL MEDICINE

## 2025-04-30 PROCEDURE — 3074F SYST BP LT 130 MM HG: CPT | Performed by: INTERNAL MEDICINE

## 2025-04-30 PROCEDURE — 1036F TOBACCO NON-USER: CPT | Performed by: INTERNAL MEDICINE

## 2025-04-30 PROCEDURE — 99214 OFFICE O/P EST MOD 30 MIN: CPT | Performed by: INTERNAL MEDICINE

## 2025-04-30 PROCEDURE — 83036 HEMOGLOBIN GLYCOSYLATED A1C: CPT | Performed by: INTERNAL MEDICINE

## 2025-04-30 PROCEDURE — 3008F BODY MASS INDEX DOCD: CPT | Performed by: INTERNAL MEDICINE

## 2025-04-30 ASSESSMENT — LIFESTYLE VARIABLES
SKIP TO QUESTIONS 9-10: 1
HOW MANY STANDARD DRINKS CONTAINING ALCOHOL DO YOU HAVE ON A TYPICAL DAY: PATIENT DOES NOT DRINK
HOW OFTEN DO YOU HAVE A DRINK CONTAINING ALCOHOL: NEVER
AUDIT-C TOTAL SCORE: 0
HOW OFTEN DO YOU HAVE SIX OR MORE DRINKS ON ONE OCCASION: NEVER

## 2025-04-30 ASSESSMENT — ENCOUNTER SYMPTOMS
OCCASIONAL FEELINGS OF UNSTEADINESS: 0
DEPRESSION: 0
LOSS OF SENSATION IN FEET: 0

## 2025-04-30 ASSESSMENT — PAIN SCALES - GENERAL: PAINLEVEL_OUTOF10: 0-NO PAIN

## 2025-04-30 ASSESSMENT — PATIENT HEALTH QUESTIONNAIRE - PHQ9
2. FEELING DOWN, DEPRESSED OR HOPELESS: NOT AT ALL
SUM OF ALL RESPONSES TO PHQ9 QUESTIONS 1 & 2: 0
1. LITTLE INTEREST OR PLEASURE IN DOING THINGS: NOT AT ALL

## 2025-05-19 ENCOUNTER — APPOINTMENT (OUTPATIENT)
Facility: CLINIC | Age: 40
End: 2025-05-19
Payer: COMMERCIAL

## 2025-05-19 VITALS
DIASTOLIC BLOOD PRESSURE: 84 MMHG | WEIGHT: 173 LBS | HEIGHT: 62 IN | BODY MASS INDEX: 31.83 KG/M2 | SYSTOLIC BLOOD PRESSURE: 130 MMHG

## 2025-05-19 DIAGNOSIS — Z12.4 SCREENING FOR CERVICAL CANCER: ICD-10-CM

## 2025-05-19 DIAGNOSIS — Z12.31 ENCOUNTER FOR SCREENING MAMMOGRAM FOR MALIGNANT NEOPLASM OF BREAST: ICD-10-CM

## 2025-05-19 DIAGNOSIS — Z01.419 WELL WOMAN EXAM: Primary | ICD-10-CM

## 2025-05-19 PROCEDURE — 3075F SYST BP GE 130 - 139MM HG: CPT | Performed by: ADVANCED PRACTICE MIDWIFE

## 2025-05-19 PROCEDURE — 3008F BODY MASS INDEX DOCD: CPT | Performed by: ADVANCED PRACTICE MIDWIFE

## 2025-05-19 PROCEDURE — 1036F TOBACCO NON-USER: CPT | Performed by: ADVANCED PRACTICE MIDWIFE

## 2025-05-19 PROCEDURE — 99396 PREV VISIT EST AGE 40-64: CPT | Performed by: ADVANCED PRACTICE MIDWIFE

## 2025-05-19 PROCEDURE — 3052F HG A1C>EQUAL 8.0%<EQUAL 9.0%: CPT | Performed by: ADVANCED PRACTICE MIDWIFE

## 2025-05-19 PROCEDURE — 3079F DIAST BP 80-89 MM HG: CPT | Performed by: ADVANCED PRACTICE MIDWIFE

## 2025-05-19 PROCEDURE — 87626 HPV SEP HI-RSK TYP&POOL RSLT: CPT

## 2025-05-19 ASSESSMENT — SOCIAL DETERMINANTS OF HEALTH (SDOH)

## 2025-05-19 ASSESSMENT — ENCOUNTER SYMPTOMS
DEPRESSION: 0
OCCASIONAL FEELINGS OF UNSTEADINESS: 0
LOSS OF SENSATION IN FEET: 0

## 2025-05-19 ASSESSMENT — PATIENT HEALTH QUESTIONNAIRE - PHQ9
SUM OF ALL RESPONSES TO PHQ9 QUESTIONS 1 & 2: 0
2. FEELING DOWN, DEPRESSED OR HOPELESS: NOT AT ALL
1. LITTLE INTEREST OR PLEASURE IN DOING THINGS: NOT AT ALL

## 2025-05-19 NOTE — PROGRESS NOTES
Assessment/Plan   Problem List Items Addressed This Visit    None  Visit Diagnoses         Codes      Well woman exam    -  Primary Z01.419      Screening for cervical cancer     Z12.4    Relevant Orders    THINPREP PAP TEST (>30)      Encounter for screening mammogram for malignant neoplasm of breast     Z12.31    Relevant Orders    BI mammo bilateral screening tomosynthesis            Kizzy Pete, APRN-CNM     Subjective   Alessandra Medrano is a 40 y.o. female who is here for a routine exam. Periods are regular every 28-30 days, lasting 5 days. Dysmenorrhea:mild, occurring first 1-2 days of flow. Cyclic symptoms include breast tenderness, irritability, and moodiness. No intermenstrual bleeding, spotting, or discharge.    Last pap 2020 NEG/NEG --> pap repeated today  Mamm req given    ROS      There were no vitals taken for this visit.    General:   Alert and oriented x 3   Heart:  Thyroid: Regular rate, rhythm  Euthyroid, normal shape and size   Lungs:  Breast: Clear to auscultation bilaterally  Symmetrical, no skin changes/nipple discharge, redness, tenderness, no masses palpated bilaterally   Abdomen: Soft, non tender   Vulva: EGBUS normal   Vagina: Pink, normal discharge   Cervix: No CMT - prolapsing to approx 3cm from introitus.   Uterus: Normal shape, size   Adnexa: NT bilaterally       Thank you for coming to see me for your visit today and most importantly thank you for having a preventative visit.  If lab work was sent, you will see your test result via [facility]  Any prescriptions will be sent electronically to your pharmacy listed    I look forward to seeing you next year for your health care needs.  Please remember:   Sleep is important - make it a priority for at least 6 hours per night!   Exercise such as walking for 20 minutes per day is beneficial to your physical and mental health   Healthy diets can be expensive -- if you every feel like you are struggling to afford healthy food, please let me  know as we have a very good program called [employer] that is available to you   Decrease alcohol and tobacco.  A goal of less than 7 alcoholic drinks per week is recommended for risk reduction of breast and colon cancer by 38%!    Be well!  Adeline

## 2025-05-21 PROCEDURE — RXMED WILLOW AMBULATORY MEDICATION CHARGE

## 2025-05-22 ENCOUNTER — PHARMACY VISIT (OUTPATIENT)
Dept: PHARMACY | Facility: CLINIC | Age: 40
End: 2025-05-22
Payer: COMMERCIAL

## 2025-05-29 LAB
CYTOLOGY CMNT CVX/VAG CYTO-IMP: NORMAL
HPV HR 12 DNA GENITAL QL NAA+PROBE: NEGATIVE
HPV HR GENOTYPES PNL CVX NAA+PROBE: NEGATIVE
HPV16 DNA SPEC QL NAA+PROBE: NEGATIVE
HPV18 DNA SPEC QL NAA+PROBE: NEGATIVE
LAB AP HPV GENOTYPE QUESTION: YES
LAB AP HPV HR: NORMAL
LABORATORY COMMENT REPORT: NORMAL
PATH REPORT.TOTAL CANCER: NORMAL

## 2025-06-19 PROCEDURE — RXMED WILLOW AMBULATORY MEDICATION CHARGE

## 2025-06-20 ENCOUNTER — PHARMACY VISIT (OUTPATIENT)
Dept: PHARMACY | Facility: CLINIC | Age: 40
End: 2025-06-20
Payer: COMMERCIAL

## 2025-07-28 PROCEDURE — RXMED WILLOW AMBULATORY MEDICATION CHARGE

## 2025-07-31 ENCOUNTER — PHARMACY VISIT (OUTPATIENT)
Dept: PHARMACY | Facility: CLINIC | Age: 40
End: 2025-07-31
Payer: COMMERCIAL

## 2025-08-26 PROCEDURE — RXMED WILLOW AMBULATORY MEDICATION CHARGE

## 2025-08-28 ENCOUNTER — PHARMACY VISIT (OUTPATIENT)
Dept: PHARMACY | Facility: CLINIC | Age: 40
End: 2025-08-28
Payer: COMMERCIAL